# Patient Record
Sex: MALE | URBAN - METROPOLITAN AREA
[De-identification: names, ages, dates, MRNs, and addresses within clinical notes are randomized per-mention and may not be internally consistent; named-entity substitution may affect disease eponyms.]

---

## 2019-09-20 ENCOUNTER — OFFICE VISIT (OUTPATIENT)
Dept: URGENT CARE | Facility: PHYSICIAN GROUP | Age: 69
End: 2019-09-20

## 2019-09-20 VITALS
HEART RATE: 56 BPM | HEIGHT: 68 IN | DIASTOLIC BLOOD PRESSURE: 74 MMHG | OXYGEN SATURATION: 95 % | TEMPERATURE: 97.3 F | SYSTOLIC BLOOD PRESSURE: 126 MMHG | RESPIRATION RATE: 16 BRPM | BODY MASS INDEX: 37.89 KG/M2 | WEIGHT: 250 LBS

## 2019-09-20 DIAGNOSIS — J01.00 ACUTE NON-RECURRENT MAXILLARY SINUSITIS: ICD-10-CM

## 2019-09-20 PROCEDURE — 99204 OFFICE O/P NEW MOD 45 MIN: CPT | Performed by: FAMILY MEDICINE

## 2019-09-20 RX ORDER — CLOPIDOGREL BISULFATE 75 MG/1
75 TABLET ORAL
COMMUNITY
Start: 2016-08-03

## 2019-09-20 RX ORDER — NITROGLYCERIN 0.4 MG/1
0.4 TABLET SUBLINGUAL
COMMUNITY

## 2019-09-20 RX ORDER — FUROSEMIDE 20 MG/1
TABLET ORAL
Refills: 0 | COMMUNITY
Start: 2019-08-20

## 2019-09-20 RX ORDER — EMPAGLIFLOZIN 25 MG/1
TABLET, FILM COATED ORAL
Refills: 1 | COMMUNITY
Start: 2019-08-03

## 2019-09-20 RX ORDER — DILTIAZEM HYDROCHLORIDE 180 MG/1
180 CAPSULE, COATED, EXTENDED RELEASE ORAL
COMMUNITY

## 2019-09-20 RX ORDER — ALLOPURINOL 300 MG/1
300 TABLET ORAL
COMMUNITY

## 2019-09-20 RX ORDER — RANOLAZINE 500 MG/1
TABLET, EXTENDED RELEASE ORAL
COMMUNITY
Start: 2019-09-13

## 2019-09-20 RX ORDER — FLUTICASONE PROPIONATE 50 MCG
2 SPRAY, SUSPENSION (ML) NASAL
COMMUNITY

## 2019-09-20 RX ORDER — FENOFIBRATE 160 MG/1
TABLET ORAL
Refills: 0 | COMMUNITY
Start: 2019-08-20

## 2019-09-20 RX ORDER — ASPIRIN 81 MG/1
81 TABLET, CHEWABLE ORAL
COMMUNITY
Start: 2016-08-03

## 2019-09-20 RX ORDER — AMOXICILLIN AND CLAVULANATE POTASSIUM 875; 125 MG/1; MG/1
1 TABLET, FILM COATED ORAL 2 TIMES DAILY
Qty: 10 TAB | Refills: 0 | Status: SHIPPED | OUTPATIENT
Start: 2019-09-20 | End: 2019-09-25

## 2019-09-20 RX ORDER — ATORVASTATIN CALCIUM 40 MG/1
TABLET, FILM COATED ORAL
Refills: 0 | COMMUNITY
Start: 2019-07-08

## 2019-09-20 RX ORDER — GLIMEPIRIDE 2 MG/1
TABLET ORAL
Refills: 0 | COMMUNITY
Start: 2019-07-29

## 2019-09-20 RX ORDER — OMEPRAZOLE 40 MG/1
40 CAPSULE, DELAYED RELEASE ORAL
COMMUNITY

## 2019-09-20 SDOH — HEALTH STABILITY: MENTAL HEALTH: HOW OFTEN DO YOU HAVE A DRINK CONTAINING ALCOHOL?: NEVER

## 2019-09-20 ASSESSMENT — ENCOUNTER SYMPTOMS
VOMITING: 0
SORE THROAT: 0
SHORTNESS OF BREATH: 0
SINUS PRESSURE: 1
EYE PAIN: 0
MYALGIAS: 0
NAUSEA: 0
DIZZINESS: 0
FEVER: 0
HEADACHES: 1
CHILLS: 0

## 2019-09-20 NOTE — PATIENT INSTRUCTIONS

## 2019-09-20 NOTE — PROGRESS NOTES
Subjective:   Reid Hardin a 69 y.o. male who presents for Sinus Problem (sinus congestion x 3 months )    Sinus Problem   This is a new problem. The current episode started more than 1 month ago. The problem has been gradually worsening since onset. There has been no fever. The pain is moderate. Associated symptoms include congestion, headaches and sinus pressure. Pertinent negatives include no chills, shortness of breath or sore throat.     Past Medical History:   Diagnosis Date   • CAD (coronary artery disease)    • Diabetes (HCC)      Past Surgical History:   Procedure Laterality Date   • CHOLECYSTECTOMY     • STENT PLACEMENT       Social History     Socioeconomic History   • Marital status: Unknown     Spouse name: Not on file   • Number of children: Not on file   • Years of education: Not on file   • Highest education level: Not on file   Occupational History   • Not on file   Social Needs   • Financial resource strain: Not on file   • Food insecurity:     Worry: Not on file     Inability: Not on file   • Transportation needs:     Medical: Not on file     Non-medical: Not on file   Tobacco Use   • Smoking status: Never Smoker   • Smokeless tobacco: Never Used   Substance and Sexual Activity   • Alcohol use: Never     Frequency: Never   • Drug use: Never   • Sexual activity: Not on file   Lifestyle   • Physical activity:     Days per week: Not on file     Minutes per session: Not on file   • Stress: Not on file   Relationships   • Social connections:     Talks on phone: Not on file     Gets together: Not on file     Attends [employer] service: Not on file     Active member of club or organization: Not on file     Attends meetings of clubs or organizations: Not on file     Relationship status: Not on file   • Intimate partner violence:     Fear of current or ex partner: Not on file     Emotionally abused: Not on file     Physically abused: Not on file     Forced sexual activity: Not on file   Other Topics  "Concern   • Not on file   Social History Narrative   • Not on file      Family History   Problem Relation Age of Onset   • Cancer Mother    • Heart Disease Sister    • Heart Disease Brother    • Heart Disease Brother    • Heart Disease Brother    • Stroke Neg Hx       Review of Systems   Constitutional: Negative for chills and fever.   HENT: Positive for congestion and sinus pressure. Negative for sore throat.    Eyes: Negative for pain.   Respiratory: Negative for shortness of breath.    Cardiovascular: Negative for chest pain.   Gastrointestinal: Negative for nausea and vomiting.   Genitourinary: Negative for hematuria.   Musculoskeletal: Negative for myalgias.   Skin: Negative for rash.   Neurological: Positive for headaches. Negative for dizziness.     No Known Allergies   Objective:   /74 (BP Location: Right arm, Patient Position: Sitting, BP Cuff Size: Adult)   Pulse (!) 56   Temp 36.3 °C (97.3 °F) (Temporal)   Resp 16   Ht 1.715 m (5' 7.5\")   Wt 113.4 kg (250 lb)   SpO2 95%   BMI 38.58 kg/m²   Physical Exam   Constitutional: He is oriented to person, place, and time. He appears well-developed and well-nourished. No distress.   HENT:   Head: Normocephalic and atraumatic.   Nose: Mucosal edema and rhinorrhea present. Right sinus exhibits maxillary sinus tenderness. Left sinus exhibits maxillary sinus tenderness.   Eyes: Pupils are equal, round, and reactive to light. Conjunctivae and EOM are normal.   Cardiovascular: Normal rate and regular rhythm.   No murmur heard.  Pulmonary/Chest: Effort normal and breath sounds normal. No respiratory distress.   Abdominal: Soft. He exhibits no distension. There is no tenderness.   Neurological: He is alert and oriented to person, place, and time. He has normal reflexes. No sensory deficit.   Skin: Skin is warm and dry.   Psychiatric: He has a normal mood and affect.   Vitals reviewed.    Assessment/Plan:   Assessment    1. Acute non-recurrent maxillary " sinusitis  - amoxicillin-clavulanate (AUGMENTIN) 875-125 MG Tab; Take 1 Tab by mouth 2 times a day for 5 days.  Dispense: 10 Tab; Refill: 0    Other orders  - allopurinol (ZYLOPRIM) 300 MG Tab; Take 300 mg by mouth.  - aspirin (ASA) 81 MG Chew Tab chewable tablet; Take 81 mg by mouth.  - atorvastatin (LIPITOR) 40 MG Tab; TK 1 T PO QD; Refill: 0  - vitamin D (CHOLECALCIFEROL) 1000 UNIT Tab; Take 1,000 Units by mouth.  - clopidogrel (PLAVIX) 75 MG Tab; Take 75 mg by mouth.  - DILTIAZem CD (CARDIZEM CD) 180 MG CAPSULE SR 24 HR; Take 180 mg by mouth.  - JARDIANCE 25 MG Tab; TK ONE T PO D; Refill: 1  - fenofibrate (TRIGLIDE) 160 MG tablet; TK 1 T PO QD; Refill: 0  - fluticasone (FLONASE) 50 MCG/ACT nasal spray; Spray 2 Sprays in nose.  - furosemide (LASIX) 20 MG Tab; TK 1 T PO QD; Refill: 0  - glimepiride (AMARYL) 2 MG Tab; TK 2 TS PO D; Refill: 0  - nitroglycerin (NITROSTAT) 0.4 MG SL Tab; Place 0.4 mg under tongue.  - omeprazole (PRILOSEC) 40 MG delayed-release capsule; Take 40 mg by mouth.  - ranolazine (RANEXA) 500 MG TABLET SR 12 HR  - Multiple Vitamins-Minerals (EYE VITAMINS) Cap; Take 1 Cap by mouth.      Differential diagnosis, natural history, supportive care, and indications for immediate follow-up discussed.  Return if symptoms worsen or fail to improve.

## 2021-03-31 ENCOUNTER — OFFICE VISIT (OUTPATIENT)
Age: 71
End: 2021-03-31

## 2021-08-01 ENCOUNTER — OFFICE VISIT (OUTPATIENT)
Age: 71
End: 2021-08-01

## 2021-09-02 ENCOUNTER — OFFICE VISIT (OUTPATIENT)
Age: 71
End: 2021-09-02

## 2022-01-01 ENCOUNTER — OFFICE VISIT (OUTPATIENT)
Age: 72
End: 2022-01-01

## 2022-02-28 ENCOUNTER — OFFICE VISIT (OUTPATIENT)
Dept: URBAN - METROPOLITAN AREA CLINIC 7 | Facility: CLINIC | Age: 72
End: 2022-02-28

## 2022-03-21 ENCOUNTER — OFFICE VISIT (OUTPATIENT)
Dept: URBAN - METROPOLITAN AREA CLINIC 7 | Facility: CLINIC | Age: 72
End: 2022-03-21

## 2022-04-02 ENCOUNTER — OFFICE VISIT (OUTPATIENT)
Age: 72
End: 2022-04-02

## 2022-05-16 ENCOUNTER — OFFICE VISIT (OUTPATIENT)
Dept: URBAN - METROPOLITAN AREA SURGERY CENTER 5 | Facility: SURGERY CENTER | Age: 72
End: 2022-05-16

## 2022-05-23 ENCOUNTER — OFFICE VISIT (OUTPATIENT)
Dept: URBAN - METROPOLITAN AREA CLINIC 7 | Facility: CLINIC | Age: 72
End: 2022-05-23

## 2022-05-23 ENCOUNTER — TELEPHONE ENCOUNTER (OUTPATIENT)
Dept: URBAN - METROPOLITAN AREA CLINIC 9 | Facility: CLINIC | Age: 72
End: 2022-05-23

## 2022-06-07 ENCOUNTER — TELEPHONE ENCOUNTER (OUTPATIENT)
Dept: URBAN - METROPOLITAN AREA CLINIC 9 | Facility: CLINIC | Age: 72
End: 2022-06-07

## 2022-06-30 ENCOUNTER — TELEPHONE ENCOUNTER (OUTPATIENT)
Dept: URBAN - METROPOLITAN AREA CLINIC 9 | Facility: CLINIC | Age: 72
End: 2022-06-30

## 2022-07-13 ENCOUNTER — TELEPHONE ENCOUNTER (OUTPATIENT)
Dept: URBAN - METROPOLITAN AREA CLINIC 9 | Facility: CLINIC | Age: 72
End: 2022-07-13

## 2022-07-30 ENCOUNTER — TELEPHONE ENCOUNTER (OUTPATIENT)
Age: 72
End: 2022-07-30

## 2022-07-30 RX ORDER — LORATADINE 10 MG
1 (ONE) TABLET,DISINTEGRATING ORAL DAILY
Qty: 0 | Refills: 2 | OUTPATIENT
Start: 2022-03-21 | End: 2022-04-20

## 2022-07-30 RX ORDER — PSYLLIUM SEED
1 (ONE) PACKET (EA) ORAL DAILY
Qty: 0 | Refills: 2 | OUTPATIENT
Start: 2022-05-23 | End: 2022-06-22

## 2022-07-30 RX ORDER — PSYLLIUM SEED
1 (ONE) PACKET (EA) ORAL DAILY
Qty: 0 | Refills: 2 | OUTPATIENT
Start: 2022-03-21 | End: 2022-04-20

## 2022-07-31 ENCOUNTER — TELEPHONE ENCOUNTER (OUTPATIENT)
Age: 72
End: 2022-07-31

## 2022-07-31 RX ORDER — LORATADINE 10 MG
1 (ONE) TABLET,DISINTEGRATING ORAL DAILY
Qty: 0 | Refills: 2 | Status: ACTIVE | COMMUNITY
Start: 2022-03-21

## 2022-07-31 RX ORDER — PSYLLIUM SEED
1 (ONE) PACKET (EA) ORAL DAILY
Qty: 0 | Refills: 2 | Status: ACTIVE | COMMUNITY
Start: 2022-05-23

## 2022-07-31 RX ORDER — PSYLLIUM SEED
1 (ONE) PACKET (EA) ORAL DAILY
Qty: 0 | Refills: 2 | Status: ACTIVE | COMMUNITY
Start: 2022-03-21